# Patient Record
Sex: FEMALE | Race: WHITE | NOT HISPANIC OR LATINO | URBAN - METROPOLITAN AREA
[De-identification: names, ages, dates, MRNs, and addresses within clinical notes are randomized per-mention and may not be internally consistent; named-entity substitution may affect disease eponyms.]

---

## 2018-04-25 ENCOUNTER — IMPORTED ENCOUNTER (OUTPATIENT)
Dept: URBAN - METROPOLITAN AREA CLINIC 9 | Facility: CLINIC | Age: 83
End: 2018-04-25

## 2018-07-11 ENCOUNTER — IMPORTED ENCOUNTER (OUTPATIENT)
Dept: URBAN - METROPOLITAN AREA CLINIC 9 | Facility: CLINIC | Age: 83
End: 2018-07-11

## 2019-01-29 ENCOUNTER — IMPORTED ENCOUNTER (OUTPATIENT)
Dept: URBAN - METROPOLITAN AREA CLINIC 9 | Facility: CLINIC | Age: 84
End: 2019-01-29

## 2019-06-26 ENCOUNTER — IMPORTED ENCOUNTER (OUTPATIENT)
Dept: URBAN - METROPOLITAN AREA CLINIC 9 | Facility: CLINIC | Age: 84
End: 2019-06-26

## 2019-12-03 ENCOUNTER — IMPORTED ENCOUNTER (OUTPATIENT)
Dept: URBAN - METROPOLITAN AREA CLINIC 9 | Facility: CLINIC | Age: 84
End: 2019-12-03

## 2020-01-15 ENCOUNTER — IMPORTED ENCOUNTER (OUTPATIENT)
Dept: URBAN - METROPOLITAN AREA CLINIC 9 | Facility: CLINIC | Age: 85
End: 2020-01-15

## 2020-03-04 ENCOUNTER — IMPORTED ENCOUNTER (OUTPATIENT)
Dept: URBAN - METROPOLITAN AREA CLINIC 9 | Facility: CLINIC | Age: 85
End: 2020-03-04

## 2020-08-26 ENCOUNTER — IMPORTED ENCOUNTER (OUTPATIENT)
Dept: URBAN - METROPOLITAN AREA CLINIC 9 | Facility: CLINIC | Age: 85
End: 2020-08-26

## 2021-02-03 NOTE — PATIENT DISCUSSION
HORDEOLUM INTERNUM OS: PATIENT EDUCATION RX WARM COMPRESSES 4-6 TIMES A DAY WITH DIGITAL MASSAGE. RX EMYCIN ANUJA TO AFFECTED AREA BID.

## 2021-02-03 NOTE — PATIENT DISCUSSION
New Prescription: neomycin-polymyxin-dexameth (neomycin-polymyxin-dexameth): ointment: 3.5-10,000-0.1 mg-unit/g-% a small amount twice a day into left eye 02-

## 2021-02-03 NOTE — PATIENT DISCUSSION
New Prescription: TobraDex (tobramycin-dexamethasone): drops,suspension: 0.3-0.1% 1 drop four times a day into affected eye 02-

## 2021-02-03 NOTE — PATIENT DISCUSSION
SEEING PT TODAY BC DR. ALTAGRACIA PATRICK IS OUT OF TOWN. PATIENT TO FOLLOW UP WITH DR. Chrystal Story NEXT WEEK.

## 2021-03-31 ENCOUNTER — IMPORTED ENCOUNTER (OUTPATIENT)
Dept: URBAN - METROPOLITAN AREA CLINIC 9 | Facility: CLINIC | Age: 86
End: 2021-03-31

## 2021-04-07 ENCOUNTER — IMPORTED ENCOUNTER (OUTPATIENT)
Dept: URBAN - METROPOLITAN AREA CLINIC 9 | Facility: CLINIC | Age: 86
End: 2021-04-07

## 2021-04-07 PROBLEM — H35.373: Noted: 2021-04-07

## 2021-04-07 PROBLEM — H26.493: Noted: 2021-04-07

## 2021-04-07 PROBLEM — H16.223: Noted: 2021-04-07

## 2021-04-07 PROBLEM — H18.513: Noted: 2021-04-07

## 2021-10-13 ENCOUNTER — ESTABLISHED COMPREHENSIVE EXAM (OUTPATIENT)
Dept: URBAN - METROPOLITAN AREA CLINIC 15 | Facility: CLINIC | Age: 86
End: 2021-10-13

## 2021-10-13 DIAGNOSIS — H16.223: ICD-10-CM

## 2021-10-13 DIAGNOSIS — H18.513: ICD-10-CM

## 2021-10-13 DIAGNOSIS — H26.493: ICD-10-CM

## 2021-10-13 PROCEDURE — 99212 OFFICE O/P EST SF 10 MIN: CPT

## 2021-10-13 ASSESSMENT — VISUAL ACUITY
OD_CC: 20/25
OS_CC: 20/30
OU_CC: J3

## 2021-10-13 ASSESSMENT — TONOMETRY
OS_IOP_MMHG: 13
OD_IOP_MMHG: 13

## 2021-10-18 ASSESSMENT — TONOMETRY
OD_IOP_MMHG: 14
OD_IOP_MMHG: 12
OD_IOP_MMHG: 15
OD_IOP_MMHG: 13
OS_IOP_MMHG: 13
OD_IOP_MMHG: 17
OD_IOP_MMHG: 12
OS_IOP_MMHG: 15
OD_IOP_MMHG: 12
OS_IOP_MMHG: 13
OS_IOP_MMHG: 12
OS_IOP_MMHG: 12
OS_IOP_MMHG: 15
OS_IOP_MMHG: 15
OS_IOP_MMHG: 13
OD_IOP_MMHG: 13
OD_IOP_MMHG: 11
OD_IOP_MMHG: 13
OS_IOP_MMHG: 11
OS_IOP_MMHG: 16

## 2021-10-18 ASSESSMENT — KERATOMETRY
OS_K2POWER_DIOPTERS: 43.5
OS_K1POWER_DIOPTERS: 42.75
OS_AXISANGLE_DEGREES: 90
OD_K1POWER_DIOPTERS: 41.5
OS_K1POWER_DIOPTERS: 42
OS_AXISANGLE2_DEGREES: 25
OS_AXISANGLE2_DEGREES: 180
OD_K1POWER_DIOPTERS: 42
OD_AXISANGLE2_DEGREES: 25
OD_AXISANGLE_DEGREES: 115
OS_AXISANGLE_DEGREES: 115
OD_AXISANGLE2_DEGREES: 25
OD_K2POWER_DIOPTERS: 43.75
OD_K2POWER_DIOPTERS: 43
OD_AXISANGLE_DEGREES: 115
OS_K2POWER_DIOPTERS: 44

## 2021-10-18 ASSESSMENT — VISUAL ACUITY
OD_CC: 20/20 -2 SN
OS_CC: 20/20 -3 SN
OS_SC: 20/40 - SN
OD_CC: 20/20 SN
OD_CC: 20/20 SN
OS_CC: 20/25 + SN
OD_SC: 20/25 SN
OD_SC: 20/25 -2 SN
OS_CC: 20/25 SN
OS_PH: 20/50 SN
OS_SC: 20/30 -2 SN
OD_SC: 20/25 SN
OD_SC: 20/40 - SN
OS_SC: 20/25 SN
OD_CC: 20/25 SN
OD_CC: 20/20 SN
OD_CC: 20/25 SN
OS_SC: 20/30 -2 SN
OD_SC: 20/30 -2 SN
OD_SC: 20/30 -2 SN
OS_SC: 20/30 SN
OS_CC: 20/25 -2 SN
OD_SC: 20/30 + SN
OS_CC: 20/25 SN
OD_SC: 20/30 SN
OS_CC: 20/25 SN

## 2022-06-15 ENCOUNTER — EMERGENCY VISIT (OUTPATIENT)
Dept: URBAN - METROPOLITAN AREA CLINIC 15 | Facility: CLINIC | Age: 87
End: 2022-06-15

## 2022-06-15 DIAGNOSIS — H18.513: ICD-10-CM

## 2022-06-15 DIAGNOSIS — H16.223: ICD-10-CM

## 2022-06-15 DIAGNOSIS — H26.493: ICD-10-CM

## 2022-06-15 DIAGNOSIS — H35.373: ICD-10-CM

## 2022-06-15 DIAGNOSIS — H43.813: ICD-10-CM

## 2022-06-15 DIAGNOSIS — H04.123: ICD-10-CM

## 2022-06-15 PROCEDURE — 92014 COMPRE OPH EXAM EST PT 1/>: CPT

## 2022-06-15 PROCEDURE — 92134 CPTRZ OPH DX IMG PST SGM RTA: CPT

## 2022-06-15 ASSESSMENT — VISUAL ACUITY
OS_CC: 20/25-1
OD_CC: 20/20-1

## 2022-06-15 ASSESSMENT — TONOMETRY
OD_IOP_MMHG: 13
OS_IOP_MMHG: 15

## 2022-06-30 RX ORDER — ALPRAZOLAM 0.5 MG/1
TABLET ORAL
COMMUNITY

## 2022-07-20 ENCOUNTER — ESTABLISHED PATIENT (OUTPATIENT)
Dept: URBAN - METROPOLITAN AREA CLINIC 15 | Facility: CLINIC | Age: 87
End: 2022-07-20

## 2022-07-20 DIAGNOSIS — H18.513: ICD-10-CM

## 2022-07-20 DIAGNOSIS — H04.123: ICD-10-CM

## 2022-07-20 DIAGNOSIS — H16.223: ICD-10-CM

## 2022-07-20 DIAGNOSIS — Z96.1: ICD-10-CM

## 2022-07-20 DIAGNOSIS — H26.493: ICD-10-CM

## 2022-07-20 PROCEDURE — 99213 OFFICE O/P EST LOW 20 MIN: CPT

## 2022-07-21 ASSESSMENT — TONOMETRY
OS_IOP_MMHG: 13
OD_IOP_MMHG: 13

## 2022-07-21 ASSESSMENT — VISUAL ACUITY
OS_CC: 20/20-2
OD_CC: 20/20
OD_CC: J2
OS_CC: J5

## 2022-09-20 ENCOUNTER — ESTABLISHED PATIENT (OUTPATIENT)
Dept: URBAN - METROPOLITAN AREA CLINIC 14 | Facility: CLINIC | Age: 87
End: 2022-09-20

## 2022-09-20 DIAGNOSIS — Z96.1: ICD-10-CM

## 2022-09-20 DIAGNOSIS — H52.4: ICD-10-CM

## 2022-09-20 DIAGNOSIS — H16.223: ICD-10-CM

## 2022-09-20 DIAGNOSIS — H26.493: ICD-10-CM

## 2022-09-20 DIAGNOSIS — H18.513: ICD-10-CM

## 2022-09-20 DIAGNOSIS — H04.123: ICD-10-CM

## 2022-09-20 PROCEDURE — 99211 OFF/OP EST MAY X REQ PHY/QHP: CPT

## 2022-09-20 PROCEDURE — 92015 DETERMINE REFRACTIVE STATE: CPT

## 2022-09-20 PROCEDURE — 92310B CONTACT LEN 60

## 2022-09-20 ASSESSMENT — VISUAL ACUITY
OD_CC: 20/20+0
OD_SC: 20/30
OS_CC: 20/25
OS_SC: 20/40-1

## 2023-01-25 ENCOUNTER — ESTABLISHED PATIENT (OUTPATIENT)
Dept: URBAN - METROPOLITAN AREA CLINIC 15 | Facility: CLINIC | Age: 88
End: 2023-01-25

## 2023-01-25 DIAGNOSIS — Z96.1: ICD-10-CM

## 2023-01-25 DIAGNOSIS — H18.513: ICD-10-CM

## 2023-01-25 DIAGNOSIS — H04.123: ICD-10-CM

## 2023-01-25 DIAGNOSIS — H26.493: ICD-10-CM

## 2023-01-25 DIAGNOSIS — H16.223: ICD-10-CM

## 2023-01-25 PROCEDURE — 92014 COMPRE OPH EXAM EST PT 1/>: CPT

## 2023-01-25 ASSESSMENT — VISUAL ACUITY
OS_CC: J2
OD_CC: J2
OD_CC: 20/20
OS_CC: 20/25

## 2023-01-25 ASSESSMENT — TONOMETRY
OD_IOP_MMHG: 10
OS_IOP_MMHG: 12